# Patient Record
Sex: MALE | Race: ASIAN | NOT HISPANIC OR LATINO | URBAN - METROPOLITAN AREA
[De-identification: names, ages, dates, MRNs, and addresses within clinical notes are randomized per-mention and may not be internally consistent; named-entity substitution may affect disease eponyms.]

---

## 2021-04-17 ENCOUNTER — EMERGENCY (EMERGENCY)
Facility: HOSPITAL | Age: 76
LOS: 1 days | Discharge: HOME | End: 2021-04-17
Attending: EMERGENCY MEDICINE
Payer: MEDICARE

## 2021-04-17 VITALS
HEIGHT: 64 IN | HEART RATE: 92 BPM | SYSTOLIC BLOOD PRESSURE: 177 MMHG | TEMPERATURE: 98 F | RESPIRATION RATE: 18 BRPM | DIASTOLIC BLOOD PRESSURE: 92 MMHG | WEIGHT: 149.91 LBS | OXYGEN SATURATION: 96 %

## 2021-04-17 PROCEDURE — 31525 DX LARYNGOSCOPY EXCL NB: CPT

## 2021-04-17 PROCEDURE — 99284 EMERGENCY DEPT VISIT MOD MDM: CPT

## 2021-04-17 PROCEDURE — 70360 X-RAY EXAM OF NECK: CPT | Mod: 26

## 2021-04-17 NOTE — ED ADULT NURSE NOTE - OBJECTIVE STATEMENT
75y male arrived to ED complaining of foreign body in the throat. Patient Khmer speaking,  Venkat #[id number]. Patient reports that he was drinking a smoothie earlier tonight and noticed plastic in the cup, reports feeling like he has some in his throat. Complains of pain on the R side of his throat associated with swallowing. Denies difficulty breathing, CP, SOB, n/v/d, abd pain, chills, fever. Patient A&Ox4, ambulatory, VS stable. Not able to visualize anything in the mouth. Not coughing or vomiting blood. No distress.

## 2021-04-17 NOTE — ED PROVIDER NOTE - PHYSICAL EXAMINATION
CONSTITUTIONAL: Nontoxic, well nourished, well developed, elderly male, resting comfortably in no acute distress  HEAD: Normocephalic; atraumatic  EYES: Normal inspection, EOMI  ENMT: External appears normal; normal oropharynx, no tonsilar swelling, no foreign body noted  NECK: Supple; tender in R mid lateral neck, no cervical lymphadenopathy  CARD: RRR; no audible murmurs, rubs, or gallops  RESP: No respiratory distress, lungs ctab/l, no stridor, speaking in full sentences  ABD: Soft, non-distended; non-tender; no rebound or guarding  EXT: No LE pitting edema or calf tenderness; distal pulses intact with good capillary refill  SKIN: Warm, dry, intact  NEURO: aaox3, moving all extremities spontaneously

## 2021-04-17 NOTE — ED ADULT TRIAGE NOTE - CHIEF COMPLAINT QUOTE
Foreign body in throat. Reports drinking a mixed drink and feeling something "sharp" in his throat. "Feels like something is stuck." Found sharp pieces of plastic in the cup. No difficulty breathing but painful to swallow.

## 2021-04-17 NOTE — ED PROVIDER NOTE - NSFOLLOWUPINSTRUCTIONS_ED_ALL_ED_FT
-You were seen for throat pain.  -Your X-ray and scope were normal.  -Take Maalox once a day as needed for pain.  -Follow up with your primary care provider in 24-48 hours.   -A copy of resulted labs, imaging, and findings have been provided to you.   -As we discussed, please return to the Emergency Department if you experience any new/worsening symptoms, including, but are not limited to: drooling, difficulty breathing, worsening pain or any other concerning symptoms.  -If you have issues obtaining follow up, please call: [phone] to obtain a doctor or specialist who takes your insurance in your area.  You may call [phone] to make an appointment with the internal medicine clinic.

## 2021-04-17 NOTE — ED PROVIDER NOTE - PROGRESS NOTE DETAILS
Dorys Herrera MD: Xray and scope with ENT with no foreign body noted. Pt remains stable. Was able to tolerate PO. Will dc with return precautions. Son notified.

## 2021-04-17 NOTE — CONSULT NOTE ADULT - PROBLEM SELECTOR RECOMMENDATION 9
- FOE: + LPRD, Airway patent, no foreign body visualized.   - XR of Neck: No radiopaque foreign body.  - PPI.   - Pain meds prn.   - If pt continues to have symptoms consider GI evaluation.   - Call ENT with questions.     # [phone]  ENT PA.

## 2021-04-17 NOTE — CONSULT NOTE ADULT - SUBJECTIVE AND OBJECTIVE BOX
CC: Foreign Body sensation in Throat.    Spoke with the help of Turkmen Interpretor ID # [id number] Ms. Jarrell.   HPI: 76 YO M with no significant PMH/PSH presents to [facility] ED for evaluation of FB sensation in Throat. Pt states he was preparing a smoothie when a piece of plastic went in what he drank. Pt tried to cough it out w/o relief. Only feels pain whenever he swallows. Pointing towards the Right Lateral neck for the foreign body. Currently remains on RA/no SOB. Able to handle secretions without difficulty. Denies fever/chills, URI, h/o dysphagia/ odynophagia, CP/SOB, Abd Pain/N/V, HA/Dizziness, stridor/drooling/trismus,  recent travel/sick contacts.     PAST MEDICAL & SURGICAL HISTORY:  No pertinent past medical history  No significant past surgical history    Allergies  No Known Allergies    Intolerances  MEDICATIONS  (STANDING):  MEDICATIONS  (PRN):    Social History: No smoking/alcohol/drug use.   Family history: None.     ROS:   ENT: all negative except as noted in HPI   CV: denies palpitations  Pulm: denies SOB, cough, hemoptysis  GI: + Odynophagia to mckeon/liquids. denies change in apetite, indigestion, n/v.  : denies pertinent urinary symptoms, urgency  Neuro: denies numbness/tingling, loss of sensation  Psych: denies anxiety  MS: denies muscle weakness, instability  Heme: denies easy bruising or bleeding  Endo: denies heat/cold intolerance, excessive sweating  Vascular: denies LE edema    Vital Signs Last 24 Hrs  T(C): 36.9 (18 Apr 2021 02:45), Max: 36.9 (17 Apr 2021 20:55)  T(F): 98.5 (18 Apr 2021 02:45), Max: 98.5 (17 Apr 2021 20:55)  HR: 88 (18 Apr 2021 02:45) (81 - 92)  BP: 165/96 (18 Apr 2021 02:45) (162/88 - 177/92)  BP(mean): --  RR: 20 (18 Apr 2021 02:45) (18 - 20)  SpO2: 98% (18 Apr 2021 02:45) (96% - 98%)     PHYSICAL EXAM:  Gen: NAD, On RA.   Skin: No rashes, bruises, or lesions  Head: Normocephalic, Atraumatic  Face: no edema, erythema, or fluctuance. Parotid glands soft without mass  Eyes: no scleral injection  Nose: Nares bilaterally patent, no discharge  Mouth: No Stridor / Drooling / Trismus.  Mucosa moist, tongue/uvula midline, oropharynx clear  Neck: Flat, supple, no lymphadenopathy, trachea midline, no masses  Lymphatic: No lymphadenopathy  Resp: breathing easily, no stridor  CV: no peripheral edema/cyanosis  GI: nondistended   Peripheral vascular: no JVD or edema  Neuro: facial nerve intact, no facial droop    Fiberoptic Indirect laryngoscopy:  (Scope #2 used)  Reason for Laryngoscopy: FB Sensation in Throat.   + LPRD, Airway patent, no foreign body visualized.  Patient was unable to cooperate with mirror.  Nasopharynx, oropharynx, and hypopharynx clear, no bleeding. Tongue base, posterior pharyngeal wall, vallecula, epiglottis, and subglottis appear normal. No erythema, edema, pooling of secretions, masses or lesions.  No glottic/supraglottic edema. True vocal cords, arytenoids, vestibular folds, ventricles, pyriform sinuses, and aryepiglottic folds appear normal bilaterally. Vocal cords mobile with good contact b/l.    IMAGING/ADDITIONAL STUDIES:   XR of Neck: No radiopaque foreign body.  CC: Foreign Body sensation in Throat.     Spoke with the help of French  ID # [id number]/Ms. Jarrell.   HPI: 76 YO M with no significant PMH/PSH presents to [facility] ED for evaluation of FB sensation in Throat. Pt states he was preparing a smoothie when a piece of plastic went in what he drank. Pt tried to cough it out w/o relief. Only feels pain whenever he swallows. Pointing towards the Right Lateral neck for the foreign body location. Currently remains on RA/no SOB. Able to handle secretions without difficulty. Denies fever/chills, URI, h/o dysphagia/ odynophagia, CP/SOB, Abd Pain/N/V, HA/Dizziness, stridor/drooling/trismus, recent travel/sick contacts.     PAST MEDICAL & SURGICAL HISTORY:  No pertinent past medical history  No significant past surgical history    Allergies  No Known Allergies    Intolerances  MEDICATIONS  (STANDING):  MEDICATIONS  (PRN):    Social History: No smoking/alcohol/drug use.   Family history: None.     ROS:   ENT: all negative except as noted in HPI   CV: denies palpitations  Pulm: denies SOB, cough, hemoptysis  GI: + Odynophagia to mckeon/liquids. denies change in apetite, indigestion, n/v.  : denies pertinent urinary symptoms, urgency  Neuro: denies numbness/tingling, loss of sensation  Psych: denies anxiety  MS: denies muscle weakness, instability  Heme: denies easy bruising or bleeding  Endo: denies heat/cold intolerance, excessive sweating  Vascular: denies LE edema    Vital Signs Last 24 Hrs  T(C): 36.9 (18 Apr 2021 02:45), Max: 36.9 (17 Apr 2021 20:55)  T(F): 98.5 (18 Apr 2021 02:45), Max: 98.5 (17 Apr 2021 20:55)  HR: 88 (18 Apr 2021 02:45) (81 - 92)  BP: 165/96 (18 Apr 2021 02:45) (162/88 - 177/92)  BP(mean): --  RR: 20 (18 Apr 2021 02:45) (18 - 20)  SpO2: 98% (18 Apr 2021 02:45) (96% - 98%)     PHYSICAL EXAM:  Gen: NAD, On RA.   Skin: No rashes, bruises, or lesions  Head: Normocephalic, Atraumatic  Face: no edema, erythema, or fluctuance. Parotid glands soft without mass  Eyes: no scleral injection  Nose: Nares bilaterally patent, no discharge  Mouth: No Stridor / Drooling / Trismus.  Mucosa moist, tongue/uvula midline, oropharynx clear  Neck: Flat, supple, no lymphadenopathy, trachea midline, no masses  Lymphatic: No lymphadenopathy  Resp: breathing easily, no stridor  CV: no peripheral edema/cyanosis  GI: nondistended   Peripheral vascular: no JVD or edema  Neuro: facial nerve intact, no facial droop    Fiberoptic Indirect laryngoscopy:  (Scope #2 used)  Reason for Laryngoscopy: FB Sensation in Throat.   + LPRD, Airway patent, no foreign body visualized.  Patient was unable to cooperate with mirror.  Nasopharynx, oropharynx, and hypopharynx clear, no bleeding. Tongue base, posterior pharyngeal wall, vallecula, epiglottis, and subglottis appear normal. No erythema, edema, pooling of secretions, masses or lesions.  No glottic/supraglottic edema. True vocal cords, arytenoids, vestibular folds, ventricles, pyriform sinuses, and aryepiglottic folds appear normal bilaterally. Vocal cords mobile with good contact b/l.    IMAGING/ADDITIONAL STUDIES:   XR of Neck: No radiopaque foreign body.

## 2021-04-17 NOTE — ED PROVIDER NOTE - PATIENT PORTAL LINK FT
You can access the FollowMyHealth Patient Portal offered by [facility] by registering at the following website: [email or web address]. By joining [employer]’s FollowMyHealth portal, you will also be able to view your health information using other applications (apps) compatible with our system.

## 2021-04-17 NOTE — ED PROVIDER NOTE - OBJECTIVE STATEMENT
76yo M with no significant PMH who presents with foreign body sensation in throat. Pt states he was preparing a smoothie when a piece of plastic went in what he drank. Has pain in R lateral neck whenever he swallows. No difficulty breathing. No pooling of secretions. No CP. 74yo M with no significant PMH who presents with foreign body sensation in throat. Pt states he was preparing a smoothie when a piece of plastic went in what he drank. Has pain in R lateral neck whenever he swallows. No difficulty breathing. No pooling of secretions. No CP.    Attendinyo male presents with foreign body sensation in the throat.  feels like a piece of plastic is stuck there as he found something in his smoothie.

## 2021-04-17 NOTE — ED PROVIDER NOTE - NS ED ROS FT
General: denies fever, chills  HENT: denies nasal congestion, sore throat, rhinorrhea  Eyes: denies vision changes  CV: denies chest pain  Resp: denies difficulty breathing, cough  Abdominal: denies nausea, vomiting, diarrhea, abdominal pain, blood in stool, dark stool  : denies pain with urination  MSK: denies recent trauma  Neuro: denies headaches, numbness, tingling, dizziness, lightheadedness.  Skin: denies new rashes

## 2021-04-17 NOTE — ED PROVIDER NOTE - CLINICAL SUMMARY MEDICAL DECISION MAKING FREE TEXT BOX
Dorys Herrera MD: 74yo M with no significant PMH who presents with foreign body sensation in throat after ingesting piece of plastic. No respiratory compromise at this time. Will observe, xray soft tissue neck and ENT consult for scope.

## 2021-04-17 NOTE — CONSULT NOTE ADULT - ASSESSMENT
76 YO M with no significant PMH/PSH presents to [facility] ED for evaluation of FB sensation in Throat. Pt states he was preparing a smoothie when a piece of plastic went in what he drank.  Currently remains on RA/no SOB. Able to handle secretions without difficulty. Fiberoptic Indirect Laryngoscopy at bedside showed, + LPRD, Airway patent, no foreign body visualized. XR of Neck was negative Foreign Bodies.  76 YO M with no significant PMH/PSH presents to [facility] ED for evaluation of FB sensation in Throat. Pt states he was preparing a smoothie when a piece of plastic went in what he drank. Currently remains on RA/no SOB. Able to handle secretions without difficulty. Fiberoptic Indirect Laryngoscopy at bedside showed, + LPRD, Airway patent, no foreign body visualized. XR of Neck was negative for Foreign Bodies.

## 2021-04-18 VITALS
SYSTOLIC BLOOD PRESSURE: 165 MMHG | TEMPERATURE: 98 F | HEART RATE: 88 BPM | OXYGEN SATURATION: 98 % | RESPIRATION RATE: 20 BRPM | DIASTOLIC BLOOD PRESSURE: 96 MMHG

## 2021-04-18 DIAGNOSIS — R09.89 OTHER SPECIFIED SYMPTOMS AND SIGNS INVOLVING THE CIRCULATORY AND RESPIRATORY SYSTEMS: ICD-10-CM

## 2021-04-18 PROCEDURE — 31505 DIAGNOSTIC LARYNGOSCOPY: CPT

## 2021-04-18 PROCEDURE — 99284 EMERGENCY DEPT VISIT MOD MDM: CPT | Mod: 25

## 2021-04-18 PROCEDURE — 70360 X-RAY EXAM OF NECK: CPT

## 2021-04-18 RX ORDER — LIDOCAINE 4 G/100G
10 CREAM TOPICAL ONCE
Refills: 0 | Status: COMPLETED | OUTPATIENT
Start: 2021-04-18 | End: 2021-04-18

## 2021-04-18 RX ADMIN — Medication 30 MILLILITER(S): at 02:37

## 2021-04-18 RX ADMIN — LIDOCAINE 10 MILLILITER(S): 4 CREAM TOPICAL at 02:37

## 2022-11-29 NOTE — ED PROVIDER NOTE - DISPOSITION TYPE
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 Plan: Discussed diagnosis in detail with patient. Advised patient of condition. Reassured patient of current condition. No treatment is required at this time. Will continue to observe condition and or symptoms. DISCHARGE

## 2023-10-17 ENCOUNTER — EMERGENCY (EMERGENCY)
Facility: HOSPITAL | Age: 78
LOS: 1 days | Discharge: HOME | End: 2023-10-17
Attending: EMERGENCY MEDICINE
Payer: MEDICARE

## 2023-10-17 VITALS
SYSTOLIC BLOOD PRESSURE: 118 MMHG | DIASTOLIC BLOOD PRESSURE: 72 MMHG | OXYGEN SATURATION: 98 % | TEMPERATURE: 98 F | HEART RATE: 75 BPM | RESPIRATION RATE: 18 BRPM

## 2023-10-17 VITALS
HEIGHT: 66 IN | DIASTOLIC BLOOD PRESSURE: 79 MMHG | HEART RATE: 77 BPM | RESPIRATION RATE: 18 BRPM | SYSTOLIC BLOOD PRESSURE: 129 MMHG | WEIGHT: 149.91 LBS | OXYGEN SATURATION: 97 % | TEMPERATURE: 98 F

## 2023-10-17 PROBLEM — Z78.9 OTHER SPECIFIED HEALTH STATUS: Chronic | Status: ACTIVE | Noted: 2021-04-17

## 2023-10-17 PROCEDURE — 99283 EMERGENCY DEPT VISIT LOW MDM: CPT | Mod: 25

## 2023-10-17 PROCEDURE — 90715 TDAP VACCINE 7 YRS/> IM: CPT

## 2023-10-17 PROCEDURE — 90471 IMMUNIZATION ADMIN: CPT

## 2023-10-17 PROCEDURE — 99284 EMERGENCY DEPT VISIT MOD MDM: CPT

## 2023-10-17 RX ORDER — TETANUS TOXOID, REDUCED DIPHTHERIA TOXOID AND ACELLULAR PERTUSSIS VACCINE, ADSORBED 5; 2.5; 8; 8; 2.5 [IU]/.5ML; [IU]/.5ML; UG/.5ML; UG/.5ML; UG/.5ML
0.5 SUSPENSION INTRAMUSCULAR ONCE
Refills: 0 | Status: COMPLETED | OUTPATIENT
Start: 2023-10-17 | End: 2023-10-17

## 2023-10-17 RX ADMIN — TETANUS TOXOID, REDUCED DIPHTHERIA TOXOID AND ACELLULAR PERTUSSIS VACCINE, ADSORBED 0.5 MILLILITER(S): 5; 2.5; 8; 8; 2.5 SUSPENSION INTRAMUSCULAR at 17:22

## 2023-10-17 RX ADMIN — Medication 1 TABLET(S): at 17:21

## 2023-10-17 NOTE — ED PROVIDER NOTE - PHYSICAL EXAMINATION
CONSTITUTIONAL: Patient is awake, alert and oriented x 3. Patient is well appearing and in no acute distress  HEAD: NCAT  NECK: supple, FROM  LUNGS: CTA b/l, no wheezing or rales   HEART: RRR.+S1S2 no murmurs  EXTREMITY: no edema or calf tenderness b/l, FROM upper and lower ext b/l  SKIN: 3 small superficial abrasions to the mid-posterior R calf without bleeding, drainage or surrounding erythema   NEURO: No focal deficits

## 2023-10-17 NOTE — ED PROVIDER NOTE - NS ED ATTENDING STATEMENT MOD
This was a shared visit with the JERSON. I reviewed and verified the documentation and independently performed the documented:

## 2023-10-17 NOTE — ED PROVIDER NOTE - PATIENT PORTAL LINK FT
You can access the FollowMyHealth Patient Portal offered by [facility] by registering at the following website: [email or web address]. By joining [employer]’s FollowMyHealth portal, you will also be able to view your health information using other applications (apps) compatible with our system.

## 2023-10-17 NOTE — ED PROVIDER NOTE - ATTENDING SHARED VISIT SELECTORS
Last Appointment:  6/29/2023  Future Appointments  7/13/2023  2:00 PM    [facility] CHF ROOM 1            [facility]            [facility]  8/17/2023  10:40 AM   MD Tera Jacobsen [facility] History/Exam/Medical Decision Making

## 2023-10-17 NOTE — ED ADULT TRIAGE NOTE - PATIENT ON (OXYGEN DELIVERY METHOD)
Please advise if it would be ok to refill for the patient. Patient has an appt on 3/20/2018. Thanks!!    room air

## 2023-10-17 NOTE — ED ADULT NURSE NOTE - NSFALLUNIVINTERV_ED_ALL_ED
Bed/Stretcher in lowest position, wheels locked, appropriate side rails in place/Call bell, personal items and telephone in reach/Instruct patient to call for assistance before getting out of bed/chair/stretcher/Non-slip footwear applied when patient is off stretcher/[place] to call system/Physically safe environment - no spills, clutter or unnecessary equipment/Purposeful proactive rounding/Room/bathroom lighting operational, light cord in reach

## 2023-10-17 NOTE — ED PROVIDER NOTE - OBJECTIVE STATEMENT
78-year-old male with no significant past medical history presents emerged department for evaluation of a dog bite.  Patient reports that yesterday was in the [place] visiting a friend at their home and around 6 PM he was bitten in the right calf by his friend's dog.  He reports that the dog had not had vaccines updated in a few years but per the owner has been otherwise acting normal up until the incident.  Patient returned home and this morning saw his PCP who referred him to the emergency department.  Patient states that the owner is someone he has a good relationship with and can contact to evaluate the behavior of the dog.  Patient denies fevers, chills, redness surrounding the bites, drainage or bleeding, weakness, numbness, headaches, dizziness, abdominal pain nausea or vomiting

## 2023-10-17 NOTE — ED ADULT TRIAGE NOTE - AS HEIGHT TYPE
stated
PAST SURGICAL HISTORY:  History of tonsillectomy     S/P parathyroidectomy 2014    Termination of pregnancy (fetus)

## 2023-10-17 NOTE — ED PROVIDER NOTE - CLINICAL SUMMARY MEDICAL DECISION MAKING FREE TEXT BOX
Dog bite to leg.  superficial bite, no laceration to close.  dog is owned by pt's friend and can be monitored so no need for rabies prophylaxis.  will update tetanus and give antibiotic prophylaxis.

## 2023-10-17 NOTE — ED ADULT NURSE NOTE - OBJECTIVE STATEMENT
78y M MikaelO x4 came in for dog bite on the right calf that occurred yesterday. Patient is mainly Maltese speaking, son is at bedside and acting as . Patient reports yesterday when he was at his friends house in the [facility] his friends dog bit him through his pants. Patient went to PCP today and was told to come to ED for further evaluation. Patient is able to monitor the dog. Patient has small bite carlos on the right calf, no erythema or irritation noted. Denies fever, chills, headache, blurry vision, dizziness, n/v/d, dysuria, numbness, tingling, SOB, and chest pain. Bed is in lowest position.

## 2023-10-17 NOTE — ED PROVIDER NOTE - NSFOLLOWUPINSTRUCTIONS_ED_ALL_ED_FT
1. Please follow up with your Primary Care Doctor after discharge, bring a copy of your results to follow up appointment     2. Please rest, stay hydrated. Keep your wounds clean and dry. Take Augmentin, 1 tab every 12  hours as prescribed     3. Recommend keeping contact with the dog owner for the next 10 days to evaluate for any change in the dogs behavior      4. Return to ED for any new or worsened symptoms of concern including swelling, redness, drainage, fevers, numbness and any other concerns

## 2025-01-24 NOTE — ED PROVIDER NOTE - DISCHARGE DATE
Spoke with patient today in regard to smoking cessation progress 3/6/12 month telephone follow up, she states that she is tobacco free.  Patient states she went cold turkey and has been quit for 8 months.  Commended patient on the accomplishments thus far.  Informed patient of benefit period, future follow up, and contact information if any further help or support is needed.  Will complete smart form for 3/6/12 month follow up on Quit attempt #1 and resolve episode.     
17-Oct-2023
negative